# Patient Record
Sex: MALE | Race: WHITE | NOT HISPANIC OR LATINO | Employment: FULL TIME | URBAN - METROPOLITAN AREA
[De-identification: names, ages, dates, MRNs, and addresses within clinical notes are randomized per-mention and may not be internally consistent; named-entity substitution may affect disease eponyms.]

---

## 2023-10-11 ENCOUNTER — APPOINTMENT (OUTPATIENT)
Dept: RADIOLOGY | Facility: MEDICAL CENTER | Age: 23
End: 2023-10-11
Attending: EMERGENCY MEDICINE

## 2023-10-11 ENCOUNTER — HOSPITAL ENCOUNTER (EMERGENCY)
Facility: MEDICAL CENTER | Age: 23
End: 2023-10-11
Attending: EMERGENCY MEDICINE

## 2023-10-11 VITALS
HEART RATE: 58 BPM | RESPIRATION RATE: 18 BRPM | HEIGHT: 75 IN | TEMPERATURE: 97.5 F | DIASTOLIC BLOOD PRESSURE: 75 MMHG | SYSTOLIC BLOOD PRESSURE: 103 MMHG | BODY MASS INDEX: 20.15 KG/M2 | WEIGHT: 162.04 LBS | OXYGEN SATURATION: 98 %
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
## 2023-10-11 DIAGNOSIS — S29.9XXA TRAUMA OF CHEST, INITIAL ENCOUNTER: ICD-10-CM

## 2023-10-11 DIAGNOSIS — S39.91XA ABDOMINAL TRAUMA, INITIAL ENCOUNTER: ICD-10-CM

## 2023-10-11 LAB
ALBUMIN SERPL BCP-MCNC: 5 G/DL (ref 3.2–4.9)
ALBUMIN/GLOB SERPL: 1.8 G/DL
ALP SERPL-CCNC: 82 U/L (ref 30–99)
ALT SERPL-CCNC: 10 U/L (ref 2–50)
ANION GAP SERPL CALC-SCNC: 10 MMOL/L (ref 7–16)
AST SERPL-CCNC: 19 U/L (ref 12–45)
BASOPHILS # BLD AUTO: 1.2 % (ref 0–1.8)
BASOPHILS # BLD: 0.09 K/UL (ref 0–0.12)
BILIRUB SERPL-MCNC: 0.6 MG/DL (ref 0.1–1.5)
BUN SERPL-MCNC: 20 MG/DL (ref 8–22)
CALCIUM ALBUM COR SERPL-MCNC: 8.8 MG/DL (ref 8.5–10.5)
CALCIUM SERPL-MCNC: 9.6 MG/DL (ref 8.5–10.5)
CHLORIDE SERPL-SCNC: 104 MMOL/L (ref 96–112)
CO2 SERPL-SCNC: 27 MMOL/L (ref 20–33)
CREAT SERPL-MCNC: 1.03 MG/DL (ref 0.5–1.4)
EOSINOPHIL # BLD AUTO: 0.13 K/UL (ref 0–0.51)
EOSINOPHIL NFR BLD: 1.8 % (ref 0–6.9)
ERYTHROCYTE [DISTWIDTH] IN BLOOD BY AUTOMATED COUNT: 38.3 FL (ref 35.9–50)
GFR SERPLBLD CREATININE-BSD FMLA CKD-EPI: 105 ML/MIN/1.73 M 2
GLOBULIN SER CALC-MCNC: 2.8 G/DL (ref 1.9–3.5)
GLUCOSE SERPL-MCNC: 87 MG/DL (ref 65–99)
HCT VFR BLD AUTO: 46.1 % (ref 42–52)
HGB BLD-MCNC: 15.8 G/DL (ref 14–18)
IMM GRANULOCYTES # BLD AUTO: 0.01 K/UL (ref 0–0.11)
IMM GRANULOCYTES NFR BLD AUTO: 0.1 % (ref 0–0.9)
LIPASE SERPL-CCNC: 24 U/L (ref 11–82)
LYMPHOCYTES # BLD AUTO: 2.39 K/UL (ref 1–4.8)
LYMPHOCYTES NFR BLD: 32.7 % (ref 22–41)
MCH RBC QN AUTO: 29.2 PG (ref 27–33)
MCHC RBC AUTO-ENTMCNC: 34.3 G/DL (ref 32.3–36.5)
MCV RBC AUTO: 85.2 FL (ref 81.4–97.8)
MONOCYTES # BLD AUTO: 0.53 K/UL (ref 0–0.85)
MONOCYTES NFR BLD AUTO: 7.2 % (ref 0–13.4)
NEUTROPHILS # BLD AUTO: 4.17 K/UL (ref 1.82–7.42)
NEUTROPHILS NFR BLD: 57 % (ref 44–72)
NRBC # BLD AUTO: 0 K/UL
NRBC BLD-RTO: 0 /100 WBC (ref 0–0.2)
PLATELET # BLD AUTO: 203 K/UL (ref 164–446)
PMV BLD AUTO: 10.4 FL (ref 9–12.9)
POTASSIUM SERPL-SCNC: 4.8 MMOL/L (ref 3.6–5.5)
PROT SERPL-MCNC: 7.8 G/DL (ref 6–8.2)
RBC # BLD AUTO: 5.41 M/UL (ref 4.7–6.1)
SODIUM SERPL-SCNC: 141 MMOL/L (ref 135–145)
WBC # BLD AUTO: 7.3 K/UL (ref 4.8–10.8)

## 2023-10-11 PROCEDURE — 36415 COLL VENOUS BLD VENIPUNCTURE: CPT

## 2023-10-11 PROCEDURE — 83690 ASSAY OF LIPASE: CPT

## 2023-10-11 PROCEDURE — A9270 NON-COVERED ITEM OR SERVICE: HCPCS | Performed by: EMERGENCY MEDICINE

## 2023-10-11 PROCEDURE — 99284 EMERGENCY DEPT VISIT MOD MDM: CPT

## 2023-10-11 PROCEDURE — 700102 HCHG RX REV CODE 250 W/ 637 OVERRIDE(OP): Performed by: EMERGENCY MEDICINE

## 2023-10-11 PROCEDURE — 85025 COMPLETE CBC W/AUTO DIFF WBC: CPT

## 2023-10-11 PROCEDURE — 700117 HCHG RX CONTRAST REV CODE 255: Performed by: EMERGENCY MEDICINE

## 2023-10-11 PROCEDURE — 71260 CT THORAX DX C+: CPT

## 2023-10-11 PROCEDURE — 71045 X-RAY EXAM CHEST 1 VIEW: CPT

## 2023-10-11 PROCEDURE — 80053 COMPREHEN METABOLIC PANEL: CPT

## 2023-10-11 RX ORDER — MORPHINE SULFATE 4 MG/ML
4 INJECTION INTRAVENOUS ONCE
Status: DISCONTINUED | OUTPATIENT
Start: 2023-10-11 | End: 2023-10-11 | Stop reason: HOSPADM

## 2023-10-11 RX ORDER — HYDROCODONE BITARTRATE AND ACETAMINOPHEN 5; 325 MG/1; MG/1
1 TABLET ORAL ONCE
Status: COMPLETED | OUTPATIENT
Start: 2023-10-11 | End: 2023-10-11

## 2023-10-11 RX ORDER — ONDANSETRON 2 MG/ML
4 INJECTION INTRAMUSCULAR; INTRAVENOUS ONCE
Status: DISCONTINUED | OUTPATIENT
Start: 2023-10-11 | End: 2023-10-11 | Stop reason: HOSPADM

## 2023-10-11 RX ADMIN — IOHEXOL 96 ML: 350 INJECTION, SOLUTION INTRAVENOUS at 18:25

## 2023-10-11 RX ADMIN — HYDROCODONE BITARTRATE AND ACETAMINOPHEN 1 TABLET: 5; 325 TABLET ORAL at 16:59

## 2023-10-11 NOTE — Clinical Note
Franc Blake was seen and treated in our emergency department on 10/11/2023.  He may return to work on 10/16/2023.       If you have any questions or concerns, please don't hesitate to call.      Winston Horn M.D.

## 2023-10-11 NOTE — ED PROVIDER NOTES
"  ER Provider Note    Scribed for Jose Luis Vazquez M.d. by Cira Ortega. 10/11/2023  3:35 PM    Primary Care Provider: No primary care provider noted.    CHIEF COMPLAINT  Chief Complaint   Patient presents with    T-5000 GLF     Pt caught his skateboard on a curb and landed on his right ribs. +tender to touch. Reports pain and difficulty taking a deep breath. -head strike, -LOC.          HPI/ROS  LIMITATION TO HISTORY   Select: : None  OUTSIDE HISTORIAN(S):  None    Franc Blake is a 22 y.o. male who presents to the ED complaining of right sided pain onset earlier today. The patient was skate boarding when it caught a curb.  Landed with his right side of his chest on the curb.  Did not hit his head or get knocked out.  No neck pain.  No midline back pain.  No numbness tingling or weakness.  Patient rates his pain as a 7/10 in severity.  This is worsened with movement, palpation and taking a deep breath.  His pain is worse with inspiration. Denies head strike and loss of consciousness. Patient does not report any other medical problems at this time.     PAST MEDICAL HISTORY  History reviewed. No pertinent past medical history.    SURGICAL HISTORY  History reviewed. No pertinent surgical history.    FAMILY HISTORY  History reviewed. No pertinent family history.    SOCIAL HISTORY   reports that he has been smoking cigarettes. He has never used smokeless tobacco. He reports that he does not currently use alcohol. He reports current drug use.    CURRENT MEDICATIONS  No current outpatient medications     ALLERGIES  Patient has no known allergies.    PHYSICAL EXAM  /73   Pulse 96   Temp 36.3 °C (97.4 °F) (Temporal)   Resp 18   Ht 1.905 m (6' 3\")   Wt 73.5 kg (162 lb 0.6 oz)   SpO2 97%   BMI 20.25 kg/m²   Constitutional: Awake alert anxious holding his right chest.  HENT: Normocephalic, Atraumatic,  Eyes: PERRL, EOMI, Conjunctiva normal, No discharge.   Neck: Normal range of motion, No tenderness, Supple, No " stridor.   Cardiovascular: Normal heart rate, Normal rhythm, No murmurs, No rubs, No gallops.   Thorax & Lungs: Normal breath sounds, No respiratory distress, No wheezing, right-sided chest wall tenderness about the nipple all the way down to the abdomen.  No crepitus.  There is a hematoma there.  Abdomen:, Soft, tenderness in the right upper quadrant no peritonitis  Skin: Warm, Dry, No erythema, No rash.   Back: No tenderness, No CVA tenderness.   Musculoskeletal: Good range of motion in all major joints. .   Neurologic: Alert,No focal deficits noted.       DIAGNOSTIC STUDIES    Labs:   Results for orders placed or performed during the hospital encounter of 10/11/23   CBC WITH DIFFERENTIAL   Result Value Ref Range    WBC 7.3 4.8 - 10.8 K/uL    RBC 5.41 4.70 - 6.10 M/uL    Hemoglobin 15.8 14.0 - 18.0 g/dL    Hematocrit 46.1 42.0 - 52.0 %    MCV 85.2 81.4 - 97.8 fL    MCH 29.2 27.0 - 33.0 pg    MCHC 34.3 32.3 - 36.5 g/dL    RDW 38.3 35.9 - 50.0 fL    Platelet Count 203 164 - 446 K/uL    MPV 10.4 9.0 - 12.9 fL    Neutrophils-Polys 57.00 44.00 - 72.00 %    Lymphocytes 32.70 22.00 - 41.00 %    Monocytes 7.20 0.00 - 13.40 %    Eosinophils 1.80 0.00 - 6.90 %    Basophils 1.20 0.00 - 1.80 %    Immature Granulocytes 0.10 0.00 - 0.90 %    Nucleated RBC 0.00 0.00 - 0.20 /100 WBC    Neutrophils (Absolute) 4.17 1.82 - 7.42 K/uL    Lymphs (Absolute) 2.39 1.00 - 4.80 K/uL    Monos (Absolute) 0.53 0.00 - 0.85 K/uL    Eos (Absolute) 0.13 0.00 - 0.51 K/uL    Baso (Absolute) 0.09 0.00 - 0.12 K/uL    Immature Granulocytes (abs) 0.01 0.00 - 0.11 K/uL    NRBC (Absolute) 0.00 K/uL   COMP METABOLIC PANEL   Result Value Ref Range    Sodium 141 135 - 145 mmol/L    Potassium 4.8 3.6 - 5.5 mmol/L    Chloride 104 96 - 112 mmol/L    Co2 27 20 - 33 mmol/L    Anion Gap 10.0 7.0 - 16.0    Glucose 87 65 - 99 mg/dL    Bun 20 8 - 22 mg/dL    Creatinine 1.03 0.50 - 1.40 mg/dL    Calcium 9.6 8.5 - 10.5 mg/dL    Correct Calcium 8.8 8.5 - 10.5 mg/dL     AST(SGOT) 19 12 - 45 U/L    ALT(SGPT) 10 2 - 50 U/L    Alkaline Phosphatase 82 30 - 99 U/L    Total Bilirubin 0.6 0.1 - 1.5 mg/dL    Albumin 5.0 (H) 3.2 - 4.9 g/dL    Total Protein 7.8 6.0 - 8.2 g/dL    Globulin 2.8 1.9 - 3.5 g/dL    A-G Ratio 1.8 g/dL   LIPASE   Result Value Ref Range    Lipase 24 11 - 82 U/L   ESTIMATED GFR   Result Value Ref Range    GFR (CKD-EPI) 105 >60 mL/min/1.73 m 2        Radiology:   The attending emergency physician has independently interpreted the diagnostic imaging associated with this visit and am waiting the final reading from the radiologist.   Preliminary interpretation is a follows: Reviewed the chest x-ray and was shot at bedside does not show pneumothorax.  Radiologist interpretation:   DX-CHEST-PORTABLE (1 VIEW)   Final Result      No acute cardiopulmonary disease evident.      CT-CHEST,ABDOMEN,PELVIS WITH    (Results Pending)        COURSE & MEDICAL DECISION MAKING     ED Observation Status? Yes; I am placing the patient in to an observation status due to a diagnostic uncertainty as well as therapeutic intensity. Patient placed in observation status at 3:35 PM, 10/11/2023.     Observation plan is as follows: Monitor for symptom management and diagnostic results         INITIAL ASSESSMENT, COURSE AND PLAN  Care Narrative:   3:35 PM - Patient seen and examined at bedside. Patient was skateboarding when he fell and landed on his right side. Denies head strike or loss of consciousness. His right side is painful and the pain is exacerbated with inspiration.     He has significant tenderness to his chest and abdomen.  Concern is for chest injury like rib fractures, pneumothorax lung contusion and abdominal injuries like splenic or liver injury.    Discussed plan of care, including a diagnostic work up with labs and imaging. Patient agrees to the plan of care. The patient will be medicated with Morphine 4 MG/ML 4 mg injection and Zofran 4 mg injection. Ordered for Lipase, CMP, CBC  w/Diff, DX-Chest and CT-Chest, Abdomen, Pelvis w/ to evaluate his symptoms.      Stat portable chest x-ray does not show pneumothorax.  Labs are ordered and obtained.  The patient is waiting for labs and a CT.    CT shows a normal hemoglobin and a normal metabolic including normal LFTs.  Chest and pelvis CT is still pending.  Is given a Norco for his discomfort.  Refused IV morphine.    He has not yet gone to CT his care be turned to my partner for further work-up and treatment at 1700.  Case discussed with Dr. Horn.  Disposition is pending.    DISPOSITION AND DISCUSSIONS  I have discussed management of the patient with the following physicians and BAHMAN's: Case was discussed Dr. Horn at around 1700.          FINAL DIANGOSIS  1. Other skateboard accident, initial encounter    2. Abdominal trauma, initial encounter    3. Trauma of chest, initial encounter       Cira MORENO (Scribe), am scribing for, and in the presence of, Jose Luis Vazquez M.D..    Electronically signed by: Cira Ortega (Chelitaibbeckie), 10/11/2023    Jose Luis MORENO M.D. personally performed the services described in this documentation, as scribed by Cira Ortega in my presence, and it is both accurate and complete.      The note accurately reflects work and decisions made by me.  Jose Luis Vazquez M.D.  10/11/2023  4:05 PM

## 2023-10-11 NOTE — ED TRIAGE NOTES
Chief Complaint   Patient presents with    T-5000 GLF     Pt caught his skateboard on a curb and landed on his right ribs. +tender to touch. Reports pain and difficulty taking a deep breath. -head strike, -LOC.       Patient ambulatory to triage for above complaint. A&Ox4, speaking in full sentences. Pt educated on triage process, placed back in lobby, and instructed to inform staff of any changes.

## 2023-10-12 NOTE — ED PROVIDER NOTES
ED Provider Note    Addendum  Patient evaluated at bedside 7 PM after CT had returned.  He states understanding of the findings.  He appears comfortable and in no apparent distress.  There is a possibility there is an occult fracture not visible however treatment will remain the same.  He will treat symptomatically with over-the-counter pain medication  and follow-up with his primary care physician.  He will be given a work note and will return to work on Monday.    CT-CHEST,ABDOMEN,PELVIS WITH   Final Result      No acute traumatic abnormality in the chest, abdomen or pelvis.      DX-CHEST-PORTABLE (1 VIEW)   Final Result      No acute cardiopulmonary disease evident.        1. Other skateboard accident, initial encounter    2. Abdominal trauma, initial encounter    3. Trauma of chest, initial encounter

## 2023-10-12 NOTE — ED NOTES
Reviewed discharge instructions, patient verbalized understanding. States they will schedule follow up appointment if needed. Verbalized understanding to not drive or operate heavy machinery while taking narcotics.    Denies further questions at this time. Pt ambulatory out of ER with steady gait where he was taken home by friend.

## 2023-10-27 ENCOUNTER — HOSPITAL ENCOUNTER (EMERGENCY)
Facility: MEDICAL CENTER | Age: 23
End: 2023-10-27
Attending: EMERGENCY MEDICINE

## 2023-10-27 VITALS
RESPIRATION RATE: 18 BRPM | HEIGHT: 75 IN | BODY MASS INDEX: 19.9 KG/M2 | WEIGHT: 160.05 LBS | OXYGEN SATURATION: 95 % | SYSTOLIC BLOOD PRESSURE: 119 MMHG | TEMPERATURE: 98.4 F | DIASTOLIC BLOOD PRESSURE: 76 MMHG | HEART RATE: 72 BPM

## 2023-10-27 DIAGNOSIS — R68.89 FLU-LIKE SYMPTOMS: ICD-10-CM

## 2023-10-27 LAB
FLUAV RNA SPEC QL NAA+PROBE: NEGATIVE
FLUBV RNA SPEC QL NAA+PROBE: NEGATIVE
RSV RNA SPEC QL NAA+PROBE: NEGATIVE
SARS-COV-2 RNA RESP QL NAA+PROBE: NOTDETECTED

## 2023-10-27 PROCEDURE — 0241U HCHG SARS-COV-2 COVID-19 NFCT DS RESP RNA 4 TRGT ED POC: CPT

## 2023-10-27 PROCEDURE — C9803 HOPD COVID-19 SPEC COLLECT: HCPCS

## 2023-10-27 PROCEDURE — 99282 EMERGENCY DEPT VISIT SF MDM: CPT

## 2023-10-27 ASSESSMENT — FIBROSIS 4 INDEX: FIB4 SCORE: 0.62

## 2023-10-28 NOTE — ED PROVIDER NOTES
"                                                        ED Provider Note    CHIEF COMPLAINT  Chief Complaint   Patient presents with    Flu Like Symptoms     Runny nose, malaise, headache, chills x 2 weeks  + sick contacts at home         HPI    Primary care provider: Pcp Pt States None   History obtained from: Patient and friend  History limited by: None     Franc Blake is a 22 y.o. male who presents to the ED with friend complaining of flulike symptoms with runny nose and \"snot\", generalized body aches, lack of energy, headache, fevers and chills, cough, nausea for 2 weeks.  Patient reports that the entire \"household\" is sick and friend states that she is also now feeling sick.  No recent travels.  Patient denies significant sore throat.  He denies vomiting/diarrhea/dysuria/rash.  He denies any past medical problems except for previous seizures.  Patient is requesting work note.    REVIEW OF SYSTEMS  Please see HPI for pertinent positives/negatives.  All other systems reviewed and are negative.     PAST MEDICAL HISTORY  No past medical history on file.     SURGICAL HISTORY  History reviewed. No pertinent surgical history.     SOCIAL HISTORY  Social History     Tobacco Use    Smoking status: Every Day     Current packs/day: 0.25     Types: Cigarettes    Smokeless tobacco: Never   Substance and Sexual Activity    Alcohol use: Not Currently    Drug use: Yes     Comment: marijuana    Sexual activity: Not on file        FAMILY HISTORY  History reviewed. No pertinent family history.     CURRENT MEDICATIONS  Home Medications       Reviewed by Ankit Munoz R.N. (Registered Nurse) on 10/27/23 at 2028  Med List Status: Not Addressed     Medication Last Dose Status        Patient Tim Taking any Medications                            ALLERGIES  No Known Allergies     PHYSICAL EXAM  VITAL SIGNS: /76   Pulse 72   Temp 36.9 °C (98.4 °F) (Oral)   Resp 18   Ht 1.905 m (6' 3\")   Wt 72.6 kg (160 lb 0.9 oz)   SpO2 95%   " BMI 20.01 kg/m²  @MATTHEW[[id number]::@     Pulse ox interpretation: 98% I interpret this pulse ox as normal     Constitutional: Well developed, well nourished, alert in no apparent distress, nontoxic appearance    HENT: No external signs of trauma, normocephalic, oropharynx moist and clear, no trismus/drooling/stridor, airway is widely patent and patient speaking with normal voice without difficulty  Eyes: PERRL, conjunctiva without erythema, no discharge, no icterus    Neck: Soft and supple, trachea midline, no stridor, no tenderness/fullness/crepitus, no LAD, good ROM    Cardiovascular: Regular rate and rhythm, no murmurs/rubs/gallops, strong distal pulses and good perfusion    Thorax & Lungs: No respiratory distress, CTAB    Abdomen: Soft, nontender, nondistended, no guarding, no rebound, normal BS    Back: No CVAT     Extremities: No cyanosis, no edema, no gross deformity, good ROM, intact distal pulses with brisk cap refill    Skin: Warm, dry, no pallor/cyanosis, no rash noted      Neuro: A/O times 3, no focal deficits noted, ambulating without difficulty  Psychiatric: Cooperative      DIAGNOSTIC STUDIES / PROCEDURES        LABS  All labs reviewed by me.     Results for orders placed or performed during the hospital encounter of 10/27/23   POC CoV-2, FLU A/B, RSV by PCR   Result Value Ref Range    POC Influenza A RNA, PCR Negative Negative    POC Influenza B RNA, PCR Negative Negative    POC RSV, by PCR Negative Negative    POC SARS-CoV-2, PCR NotDetected         RADIOLOGY  I have independently interpreted the diagnostic imaging associated with this visit and am waiting the final reading from the radiologist.     No orders to display          COURSE & MEDICAL DECISION MAKING  Nursing notes, VS, PMSFHx reviewed in chart.     Review of past medical records shows the patient was last seen in this ED October 11, 2023 after skateboard injury.  No other prior ED visits or other records available for  review.      Differential diagnoses considered include but are not limited to: URI, pneumonia, bronchitis, influenza, COVID, pharyngitis/tonsillitis, epiglottitis, peritonsillar abscess, retropharyngeal abscess, sinusitis, viral syndrome, allergic rhinitis      ED Observation Status? No; Patient does not meet criteria for ED Observation.       Discussion of management with other [facility] [place] or appropriate source(s): None     Escalation of care considered, and ultimately not performed: blood analysis and diagnostic imaging.     Barriers to care at this time, including but not limited to: Patient does not have established PCP.     Decision tools and prescription drugs considered including, but not limited to: Antibiotics   .        History and physical exam as above.  This is a generally healthy 22-year-old male patient who presents to the ED with flulike symptoms.  Influenza/RSV/COVID-19 testing returned negative.  I discussed the findings with patient and friend.  Patient is alert, in no acute distress and nontoxic in appearance and has been clinically stable during his ED stay.  He tolerated oral fluids without difficulty.  At this time, I have low clinical suspicion for emergent pathology such as sepsis, meningitis, pharyngeal abscess, epiglottitis, bacterial tracheitis or pneumonia, myocarditis, multisystem inflammatory syndrome, acute abdomen.  I discussed with patient supportive home care for likely viral process, outpatient follow-up and return to ED precautions.  Patient verbalized understanding and agreed with plan of care with no further questions or concerns.      The patient is referred to a primary physician for blood pressure management, diabetic screening, and for all other preventative health concerns.       FINAL IMPRESSION  1. Flu-like symptoms Acute          DISPOSITION  Patient will be discharged home in stable condition.       FOLLOW UP  [facility]  [address]  [place]  [address]  [phone]  Call in 3 days      [facility]  [address] [facility] [address]  [phone]  Call in 3 days      [facility], Emergency Dept  [address] [facility] [id number] [phone]    If symptoms worsen         OUTPATIENT MEDICATIONS  There are no discharge medications for this patient.         Electronically signed by: Erasto Mukherjee D.O., 10/27/2023 8:50 PM      Portions of this record were made with voice recognition software.  Despite my review, errors may remain.  Please interpret this chart in the appropriate context.

## 2023-10-28 NOTE — ED TRIAGE NOTES
"Chief Complaint   Patient presents with    Flu Like Symptoms     Runny nose, malaise, headache, chills x 2 weeks  + sick contacts at home      /80   Pulse 84   Temp 37.1 °C (98.8 °F) (Temporal)   Resp 18   Ht 1.905 m (6' 3\")   Wt 72.6 kg (160 lb 0.9 oz)   SpO2 98%   BMI 20.01 kg/m²     Pt made aware of triage process, placed back into lobby, educated pt to tell staff of any worsening of symptoms     "

## 2023-12-18 ENCOUNTER — HOSPITAL ENCOUNTER (EMERGENCY)
Facility: MEDICAL CENTER | Age: 23
End: 2023-12-18
Attending: EMERGENCY MEDICINE

## 2023-12-18 VITALS
HEART RATE: 80 BPM | RESPIRATION RATE: 16 BRPM | OXYGEN SATURATION: 97 % | BODY MASS INDEX: 20.08 KG/M2 | TEMPERATURE: 98 F | HEIGHT: 76 IN | WEIGHT: 164.9 LBS | SYSTOLIC BLOOD PRESSURE: 103 MMHG | DIASTOLIC BLOOD PRESSURE: 61 MMHG

## 2023-12-18 DIAGNOSIS — K04.7 INFECTED DENTAL CARIES: ICD-10-CM

## 2023-12-18 DIAGNOSIS — K02.9 INFECTED DENTAL CARIES: ICD-10-CM

## 2023-12-18 DIAGNOSIS — K02.9 PAIN DUE TO DENTAL CARIES: ICD-10-CM

## 2023-12-18 PROCEDURE — 99282 EMERGENCY DEPT VISIT SF MDM: CPT

## 2023-12-18 RX ORDER — AMOXICILLIN AND CLAVULANATE POTASSIUM 875; 125 MG/1; MG/1
1 TABLET, FILM COATED ORAL 2 TIMES DAILY
Qty: 14 TABLET | Refills: 0 | Status: ACTIVE | OUTPATIENT
Start: 2023-12-18 | End: 2023-12-25

## 2023-12-18 ASSESSMENT — PAIN DESCRIPTION - PAIN TYPE: TYPE: ACUTE PAIN

## 2023-12-18 ASSESSMENT — FIBROSIS 4 INDEX: FIB4 SCORE: 0.65

## 2023-12-18 NOTE — ED NOTES
All discharge instructions given to pt.   Pt verbalized understanding of all discharge instructions.  All questions answered.  Dental referral sheet sent with pt. All personal belongings sent with pt. Pt ambulatory to [facility].

## 2023-12-18 NOTE — ED PROVIDER NOTES
"ED Provider Note    CHIEF COMPLAINT  Chief Complaint   Patient presents with    Dental Pain     Edema noted to upper lip and face           HPI/ROS  LIMITATION TO HISTORY   Select: : None  OUTSIDE HISTORIAN(S):  None    Franc Blake is a 23 y.o. male who presents to the emergency department for evaluation of dental pain.  The patient has had pain in the left upper canine area.  This is painful and swollen.  He gargle some salt water and peroxide and popped and pus drained out.  He still has dental pain there.  Denies any fevers or chills.  Denies any other acute concerns or complaints has not seen a dentist.    PAST MEDICAL HISTORY       SURGICAL HISTORY  patient denies any surgical history    FAMILY HISTORY  History reviewed. No pertinent family history.    SOCIAL HISTORY  Social History     Tobacco Use    Smoking status: Every Day     Current packs/day: 0.25     Types: Cigarettes    Smokeless tobacco: Never   Substance and Sexual Activity    Alcohol use: Not Currently    Drug use: Yes     Comment: marijuana    Sexual activity: Not on file       CURRENT MEDICATIONS  Home Medications       Reviewed by Brenda Smith R.N. (Registered Nurse) on 12/18/23 at 1306  Med List Status: Partial     Medication Last Dose Status        Patient Tim Taking any Medications                           ALLERGIES  No Known Allergies    PHYSICAL EXAM  VITAL SIGNS: /75   Pulse 82   Temp 36.6 °C (97.9 °F) (Temporal)   Resp 18   Ht 1.93 m (6' 4\")   Wt 74.8 kg (164 lb 14.5 oz)   SpO2 98%   BMI 20.07 kg/m²    Constitutional: Well developed, Well nourished, No acute distress, Non-toxic appearance.   HENT: Normocephalic, Atraumatic, Bilateral external ears normal, poor dentition.  He has dental caries across essentially every anterior central tooth.  He has a dental carry tooth #10.  With some swelling of the gum above but no facial swelling or obvious abscess  Eyes: PERRL, EOMI, Conjunctiva normal, No discharge.   Neck: Normal " range of motion,  Cardiovascular: Normal heart rate, Normal rhythm, No murmurs, No rubs, No gallops.   Thorax & Lungs: Normal breath sounds, No respiratory distress, No wheezing    Musculoskeletal: Good range of motion in all major joints.        DIAGNOSTIC STUDIES / PROCEDURES      RADIOLOGY  none    COURSE & MEDICAL DECISION MAKING    ED Observation Status? No; Patient does not meet criteria for ED Observation.     INITIAL ASSESSMENT, COURSE AND PLAN  Care Narrative:   23-year-old male with dental pain was on the left upper canine abscess that ruptured on its own.  He has some swelling of the gum there but no significant abscess.  He does have some dental caries.  He does not require hospitalization or imaging at this time.    Patient was started on antibiotics and provided referral to oral surgery given a dental referral sheet.  He can be given instructions for dental pain.  Return for pain swelling redness fever or other concerns.  Questions were answered is agreeable to plan.          DISPOSITION AND DISCUSSIONS    Escalation of care considered, and ultimately not performed:blood analysis and diagnostic imaging    Barriers to care at this time, including but not limited to: Patient does not have established PCP.     Decision tools and prescription drugs considered including, but not limited to: Antibiotics   .    Marty Mckenna M.D.  [address]  [place] [address]  [phone]    Schedule an appointment as soon as possible for a visit in 2 days          FINAL DIAGNOSIS  1. Pain due to dental caries    2. Infected dental caries           Electronically signed by: Jose Luis Vazquez M.D., 12/18/2023 2:51 PM

## 2023-12-18 NOTE — DISCHARGE INSTRUCTIONS
Follow-up with a dentist or Dr. Mckenna from oral surgery.  You will likely need your teeth extracted.  Take antibiotics as prescribed.  Turn to the ER for more pain swelling or other concerns.